# Patient Record
Sex: MALE | Race: WHITE
[De-identification: names, ages, dates, MRNs, and addresses within clinical notes are randomized per-mention and may not be internally consistent; named-entity substitution may affect disease eponyms.]

---

## 2017-12-23 ENCOUNTER — HOSPITAL ENCOUNTER (EMERGENCY)
Dept: HOSPITAL 17 - NEPD | Age: 22
LOS: 1 days | Discharge: HOME | End: 2017-12-24
Payer: COMMERCIAL

## 2017-12-23 VITALS
OXYGEN SATURATION: 97 % | SYSTOLIC BLOOD PRESSURE: 134 MMHG | HEART RATE: 109 BPM | RESPIRATION RATE: 14 BRPM | TEMPERATURE: 98.2 F | DIASTOLIC BLOOD PRESSURE: 69 MMHG

## 2017-12-23 DIAGNOSIS — F41.9: Primary | ICD-10-CM

## 2017-12-23 DIAGNOSIS — F43.10: ICD-10-CM

## 2017-12-23 DIAGNOSIS — F84.5: ICD-10-CM

## 2017-12-23 DIAGNOSIS — Z79.899: ICD-10-CM

## 2017-12-23 DIAGNOSIS — F98.8: ICD-10-CM

## 2017-12-23 LAB
ALBUMIN SERPL-MCNC: 3.7 GM/DL (ref 3.4–5)
ALP SERPL-CCNC: 143 U/L (ref 45–117)
ALT SERPL-CCNC: 21 U/L (ref 12–78)
AST SERPL-CCNC: 13 U/L (ref 15–37)
BASOPHILS # BLD AUTO: 0 TH/MM3 (ref 0–0.2)
BASOPHILS NFR BLD: 0.2 % (ref 0–2)
BILIRUB SERPL-MCNC: 0.5 MG/DL (ref 0.2–1)
BUN SERPL-MCNC: 13 MG/DL (ref 7–18)
CALCIUM SERPL-MCNC: 9.1 MG/DL (ref 8.5–10.1)
CHLORIDE SERPL-SCNC: 100 MEQ/L (ref 98–107)
CREAT SERPL-MCNC: 0.95 MG/DL (ref 0.6–1.3)
EOSINOPHIL # BLD: 0.1 TH/MM3 (ref 0–0.4)
EOSINOPHIL NFR BLD: 0.9 % (ref 0–4)
ERYTHROCYTE [DISTWIDTH] IN BLOOD BY AUTOMATED COUNT: 13 % (ref 11.6–17.2)
GFR SERPLBLD BASED ON 1.73 SQ M-ARVRAT: 99 ML/MIN (ref 89–?)
GLUCOSE SERPL-MCNC: 90 MG/DL (ref 74–106)
HCO3 BLD-SCNC: 30.6 MEQ/L (ref 21–32)
HCT VFR BLD CALC: 41 % (ref 39–51)
HGB BLD-MCNC: 14.6 GM/DL (ref 13–17)
LYMPHOCYTES # BLD AUTO: 1.6 TH/MM3 (ref 1–4.8)
LYMPHOCYTES NFR BLD AUTO: 13.1 % (ref 9–44)
MCH RBC QN AUTO: 29.3 PG (ref 27–34)
MCHC RBC AUTO-ENTMCNC: 35.6 % (ref 32–36)
MCV RBC AUTO: 82.2 FL (ref 80–100)
MONOCYTE #: 1.4 TH/MM3 (ref 0–0.9)
MONOCYTES NFR BLD: 11.7 % (ref 0–8)
NEUTROPHILS # BLD AUTO: 9.2 TH/MM3 (ref 1.8–7.7)
NEUTROPHILS NFR BLD AUTO: 74.1 % (ref 16–70)
PLATELET # BLD: 277 TH/MM3 (ref 150–450)
PMV BLD AUTO: 7.5 FL (ref 7–11)
PROT SERPL-MCNC: 7.7 GM/DL (ref 6.4–8.2)
RBC # BLD AUTO: 4.99 MIL/MM3 (ref 4.5–5.9)
SODIUM SERPL-SCNC: 136 MEQ/L (ref 136–145)
WBC # BLD AUTO: 12.4 TH/MM3 (ref 4–11)

## 2017-12-23 PROCEDURE — 85025 COMPLETE CBC W/AUTO DIFF WBC: CPT

## 2017-12-23 PROCEDURE — 99284 EMERGENCY DEPT VISIT MOD MDM: CPT

## 2017-12-23 PROCEDURE — 80053 COMPREHEN METABOLIC PANEL: CPT

## 2017-12-23 PROCEDURE — 80307 DRUG TEST PRSMV CHEM ANLYZR: CPT

## 2017-12-23 NOTE — PD
HPI


Chief Complaint:  Psychiatric Symptoms


Time Seen by Provider:  16:45


Travel History


International Travel<30 days:  No


Contact w/Intl Traveler<30days:  No


Traveled to known affect area:  No





History of Present Illness


HPI


22-year-old male patient with history of Asperger syndrome, ADHD, PTSD, 

presents to the ER brought in by mom because he apparently has had several 

medication changes done by his psychiatrist over the last few weeks and mom 

states that he is not doing well with the new medications.  He had been started 

on what to do, and she states that he has been fairly anxious, having racing 

thoughts, tells her that he is having these thoughts and they are of blood but 

he denies any actual suicidal or homicidal ideation.  Mom is concerned because 

he is pacing and appears based out.  He denies any homicidal or suicidal 

ideation.  Mom states that they wanted to try to get into see their 

psychiatrist but they are not open until after the new year.





Modifying Factors: None


Associated Signs & Symptoms: Increased anxiety, racing thoughts, hallucinations


Risk Factors: History of ADHD, Asperger syndrome





PFSH


Past Medical History


ADD:  Yes


Depression:  Yes


Diminished Hearing:  No


Psychiatric:  Yes (PTSD, ASPBERGER  )


Integumentary:  Yes (PSOARIASIS)


Immunizations Current:  No


Tetanus Vaccination:  Unknown


Influenza Vaccination:  No





Social History


Alcohol Use:  No


Tobacco Use:  No


Substance Use:  No





Allergies-Medications


(Allergen,Severity, Reaction):  


Coded Allergies:  


     No Known Allergies (Unverified , 12/23/17)


Reported Meds & Prescriptions





Reported Meds & Active Scripts


Active


Reported


Zoloft (Sertraline HCl) 25 Mg Tab 25 Mg PO DAILY








Review of Systems


ROS Limitations:  Poor Historian


Except as stated in HPI:  all other systems reviewed are Neg (history is as per 

mom)





Physical Exam


Narrative


GENERAL: Anxious appearing well-developed young white male patient currently in 

mild distress.  Awake and oriented 3.


SKIN: Focused skin assessment warm/dry.


HEAD: Atraumatic. Normocephalic. 


EYES: Pupils equal and round. No scleral icterus. No injection or drainage. 


ENT: No nasal bleeding or discharge.  Mucous membranes pink and moist.


NECK: Trachea midline. No JVD. 


CARDIOVASCULAR: Regular rate and rhythm.  No murmur appreciated.


RESPIRATORY: No accessory muscle use. Clear to auscultation. Breath sounds 

equal bilaterally. 


GASTROINTESTINAL: Abdomen soft, non-tender, nondistended. Hepatic and splenic 

margins not palpable. 


MUSCULOSKELETAL: No obvious deformities. No clubbing.  No cyanosis.  No edema. 


NEUROLOGICAL: Awake and alert. No obvious cranial nerve deficits.  Motor 

grossly within normal limits. Normal speech.


PSYCHIATRIC: Anxious mood and affect; insight and judgment poor.





Data


Data


Last Documented VS





Vital Signs








  Date Time  Temp Pulse Resp B/P (MAP) Pulse Ox O2 Delivery O2 Flow Rate FiO2


 


12/23/17 15:40 98.2 109 14 134/69 (90) 97   








Orders





 Orders


Complete Blood Count With Diff (12/23/17 16:55)


Comprehensive Metabolic Panel (12/23/17 16:55)


Psych Screen (12/23/17 16:55)


Drug Screen, Random Urine (12/23/17 16:55)


Alcohol (Ethanol) (12/23/17 16:55)








MDM


Medical Decision Making


Medical Screen Exam Complete:  Yes


Emergency Medical Condition:  Yes


Medical Record Reviewed:  Yes


Differential Diagnosis


Medication side effects versus psychosis versus metabolic issues versus anxiety 

attack


Narrative Course


Initial lab work and workup was done in order to medically cleared patient for 

psychiatric evaluation.  He and mom state that they want to stay voluntarily to 

be evaluated by psychiatrist.  He has no homicidal or suicidal ideation 

currently.





Physician Communication


Physician Communication


Case is discussed with Dr. Ortiz At 5 PM and signed out to him pending workup.





Diagnosis





 Primary Impression:  


 Anxiety


Condition:  Stable











Adrianna Gutierrez MD Dec 23, 2017 17:22

## 2017-12-23 NOTE — PD
Data


Data


Last Documented VS





Vital Signs








  Date Time  Temp Pulse Resp B/P (MAP) Pulse Ox O2 Delivery O2 Flow Rate FiO2


 


12/23/17 15:40 98.2 109 14 134/69 (90) 97   








Orders





 Orders


Complete Blood Count With Diff (12/23/17 16:55)


Comprehensive Metabolic Panel (12/23/17 16:55)


Psych Screen (12/23/17 16:55)


Drug Screen, Random Urine (12/23/17 16:55)


Alcohol (Ethanol) (12/23/17 16:55)





Labs





Laboratory Tests








Test


  12/23/17


17:25 12/23/17


17:30


 


White Blood Count 12.4 TH/MM3  


 


Red Blood Count 4.99 MIL/MM3  


 


Hemoglobin 14.6 GM/DL  


 


Hematocrit 41.0 %  


 


Mean Corpuscular Volume 82.2 FL  


 


Mean Corpuscular Hemoglobin 29.3 PG  


 


Mean Corpuscular Hemoglobin


Concent 35.6 % 


  


 


 


Red Cell Distribution Width 13.0 %  


 


Platelet Count 277 TH/MM3  


 


Mean Platelet Volume 7.5 FL  


 


Neutrophils (%) (Auto) 74.1 %  


 


Lymphocytes (%) (Auto) 13.1 %  


 


Monocytes (%) (Auto) 11.7 %  


 


Eosinophils (%) (Auto) 0.9 %  


 


Basophils (%) (Auto) 0.2 %  


 


Neutrophils # (Auto) 9.2 TH/MM3  


 


Lymphocytes # (Auto) 1.6 TH/MM3  


 


Monocytes # (Auto) 1.4 TH/MM3  


 


Eosinophils # (Auto) 0.1 TH/MM3  


 


Basophils # (Auto) 0.0 TH/MM3  


 


CBC Comment DIFF FINAL  


 


Differential Comment   


 


Blood Urea Nitrogen 13 MG/DL  


 


Creatinine 0.95 MG/DL  


 


Random Glucose 90 MG/DL  


 


Total Protein 7.7 GM/DL  


 


Albumin 3.7 GM/DL  


 


Calcium Level 9.1 MG/DL  


 


Alkaline Phosphatase 143 U/L  


 


Aspartate Amino Transf


(AST/SGOT) 13 U/L 


  


 


 


Alanine Aminotransferase


(ALT/SGPT) 21 U/L 


  


 


 


Total Bilirubin 0.5 MG/DL  


 


Sodium Level 136 MEQ/L  


 


Potassium Level 4.0 MEQ/L  


 


Chloride Level 100 MEQ/L  


 


Carbon Dioxide Level 30.6 MEQ/L  


 


Anion Gap 5 MEQ/L  


 


Estimat Glomerular Filtration


Rate 99 ML/MIN 


  


 


 


Ethyl Alcohol Level


  LESS THAN 3


MG/DL 


 


 


Urine Opiates Screen  NEG 


 


Urine Barbiturates Screen  NEG 


 


Urine Amphetamines Screen  NEG 


 


Urine Benzodiazepines Screen  NEG 


 


Urine Cocaine Screen  NEG 


 


Urine Cannabinoids Screen  NEG 











MDM


Supervised Visit with NEREYDA:  No


Narrative Course


Patient was initially evaluated by the previous provider and sent out to me at 

the beginning of my shift pending labs for medical clearance for psychiatric 

evaluation.  See her note for further details.





Briefly this is a 22-year-old male with history of Asperger's and ADD with 

recent medication changes by his psychiatrist, here with his mom for evaluation 

of unusual behavior and racing thoughts.  The patient denies suicidal or 

homicidal ideation.  He is awake, alert, calm, pleasant on exam.  Labs were 

reviewed and the patient is medically cleared for psychiatric evaluation and 

disposition by them.


Diagnosis





 Primary Impression:  


 Anxiety


Condition:  Stable











Roosevelt Ortiz MD Dec 23, 2017 19:33

## 2017-12-24 VITALS — SYSTOLIC BLOOD PRESSURE: 122 MMHG | DIASTOLIC BLOOD PRESSURE: 57 MMHG

## 2017-12-24 NOTE — PD.PSY.CON
Provisional Diagnosis


Admission Date





[place]


[place] disorder





History of Present Illness


Service


Psychiatry


Consult Requested By


EDMD


Reason for Consult


Assessment


Primary Care Physician


Unknown


HPI


Patient 22-year-old white male with a diagnosis of Asperger's disorder comes to 

nurse department with his mother.  Mother appears to be invested in her son.  

She and her son live with her parents.  Her son is being seen through [facility].  Diffusely been on Zoloft 50 mg daily.  It appears she showed 

some type of anxiety disorder or mood behavior she was seen by the [facility]NP.  The 

dose was reduced to 25 mg.  Of them discontinued.  The boy has shown some mild 

arousal symptoms some distractibility some vague paranoia.  It appears she 

prior was on Prozac and developed tremors which led to switch to Zoloft.  

Mother denies any prior psychiatric hospitalization for this young man.  She 

denies any alcohol or drug use denies any voices or visions.  Patient has the 

typical Ascaris type voices behaviors somewhat childlike in nature.  Deferring 

completely to his mother.  In any event the patient does denies suicidality 

homicidality voices or visions.  He has been off all medication for a brief 

period of time.  We did discuss medications with patient's mother.  I feel at 

this time perhaps a low-dose of a second generation atypical antipsychotic 

might help with his mood and so the anxiety type features.  Will offer Abilify 

5 mg a.m. and at bedtime with a 2 week supply.  Patient does have an 

appointment with [facility] on 1/10/18.  Mother is agreeable to this.





Review of Systems


Constitutional:  DENIES: Diaphoretic episodes, Fatigue, Fever, Weight gain, 

Weight loss, Chills, Dizziness, Change in appetite, Night Sweats


Endocrine:  DENIES: Heat/cold intolerance, Polydipsia, Polyuria, Polyphagia


Eyes:  DENIES: Blurred vision, Diplopia, Eye inflammation, Eye pain, Vision loss

, Photosensitivity, Double Vision


Ears, nose, mouth, throat:  DENIES: Tinnitus, Hearing loss, Vertigo, Nasal 

discharge, Oral lesions, Throat pain, Hoarseness, Ear Pain, Running Nose, 

Epistaxis, Sinus Pain, Toothache, Odynophagia


Respiratory:  DENIES: Apneas, Cough, Snoring, Wheezing, Hemoptysis, Sputum 

production, Shortness of breath


Cardiovascular:  DENIES: Chest pain, Palpitations, Syncope, Dyspnea on Exertion

, PND, Lower Extremity Edema, Orthopnea, Claudication


Gastrointestinal:  DENIES: Abdominal pain, Black stools, Bloody stools, 

Constipation, Diarrhea, Nausea, Vomiting, Difficulty Swallowing, Anorexia


Musculoskeletal:  DENIES: Joint pain, Muscle aches, Stiffness, Joint Swelling, 

Back pain, Neck pain


Hematologic/lymphatic:  DENIES: Bruising, Lymphadenopathy


Psychiatric:  COMPLAINS OF: Anxiety (mild), Hallucinations (denies), Suicidal 

Ideation (denies)





Past Family Social History


Coded Allergies:  


     No Known Allergies (Unverified , 12/23/17)


Past Medical History


History Asperger's


Reported Medications


Sertraline (Zoloft) 25 Mg Tab, 25 MG PO DAILY, #30 TAB 0 Refills


   12/23/17


Family Psych History


Denies


Social History


Patient lives with mother and his maternal grandparents


Patient's Strengths (min. 2)


Patient verbal cooperative has supportive family





Physical Exam


Patient medically cleared ED


Vital Signs





Vital Signs








  Date Time  Temp Pulse Resp B/P (MAP) Pulse Ox O2 Delivery O2 Flow Rate FiO2


 


12/23/17 15:40 98.2 109 14 134/69 (90) 97   








Lab Results











Test


  12/23/17


17:25 12/23/17


17:30


 


White Blood Count 12.4 TH/MM3  


 


Red Blood Count 4.99 MIL/MM3  


 


Hemoglobin 14.6 GM/DL  


 


Hematocrit 41.0 %  


 


Mean Corpuscular Volume 82.2 FL  


 


Mean Corpuscular Hemoglobin 29.3 PG  


 


Mean Corpuscular Hemoglobin


Concent 35.6 % 


  


 


 


Red Cell Distribution Width 13.0 %  


 


Platelet Count 277 TH/MM3  


 


Mean Platelet Volume 7.5 FL  


 


Neutrophils (%) (Auto) 74.1 %  


 


Lymphocytes (%) (Auto) 13.1 %  


 


Monocytes (%) (Auto) 11.7 %  


 


Eosinophils (%) (Auto) 0.9 %  


 


Basophils (%) (Auto) 0.2 %  


 


Neutrophils # (Auto) 9.2 TH/MM3  


 


Lymphocytes # (Auto) 1.6 TH/MM3  


 


Monocytes # (Auto) 1.4 TH/MM3  


 


Eosinophils # (Auto) 0.1 TH/MM3  


 


Basophils # (Auto) 0.0 TH/MM3  


 


CBC Comment DIFF FINAL  


 


Differential Comment   


 


Blood Urea Nitrogen 13 MG/DL  


 


Creatinine 0.95 MG/DL  


 


Random Glucose 90 MG/DL  


 


Total Protein 7.7 GM/DL  


 


Albumin 3.7 GM/DL  


 


Calcium Level 9.1 MG/DL  


 


Alkaline Phosphatase 143 U/L  


 


Aspartate Amino Transf


(AST/SGOT) 13 U/L 


  


 


 


Alanine Aminotransferase


(ALT/SGPT) 21 U/L 


  


 


 


Total Bilirubin 0.5 MG/DL  


 


Sodium Level 136 MEQ/L  


 


Potassium Level 4.0 MEQ/L  


 


Chloride Level 100 MEQ/L  


 


Carbon Dioxide Level 30.6 MEQ/L  


 


Anion Gap 5 MEQ/L  


 


Estimat Glomerular Filtration


Rate 99 ML/MIN 


  


 


 


Ethyl Alcohol Level


  LESS THAN 3


MG/DL 


 


 


Urine Opiates Screen  NEG 


 


Urine Barbiturates Screen  NEG 


 


Urine Amphetamines Screen  NEG 


 


Urine Benzodiazepines Screen  NEG 


 


Urine Cocaine Screen  NEG 


 


Urine Cannabinoids Screen  NEG 











Mental Status Examination


Appearance:  Appropriate


Consciousness:  Alert


Orientation:  Person, Place, Date/Time


Motor Activity:  Normal gait


Speech:  Hesitant


Language:  Adequate


Fund of Knowledge:  Adequate


Attention and Concentration:  Other (fair)


Memory:  Unremarkable


Mood:  Other (somewhat restricted)


Affect:  Other (decreased range and intensity)


Thought Process & Associations:  Linear


Thought Content:  Ideas of reference


Hallucination Type:  None


Delusion Type:  None


Suicidal Ideation:  No


Suicidal Plan:  No


Suicidal Intention:  No


Homicidal Ideation:  No


Homicidal Plan:  No


Homicidal Intention:  No


Insight:  Fair


Judgment:  Adequate





Assessment & Plan


Problem List:  


(1) Asperger's disorder


ICD Codes:  F84.5 - Asperger's syndrome


Assessment & Plan


Estimated LOS:  days patient calm cooperative though deferring to.  His mother 

extensively.  Will offer Abilify 5 mg twice a day, patient will follow-up 

through [facility] on 1/10/17


Discharge Planning


See above


Request HC Surrog/Guard Advoc?:  No











Jian Mackay MD Dec 24, 2017 10:57

## 2017-12-24 NOTE — PD
Physical Exam


Date Seen by Provider:  Dec 24, 2017


Time Seen by Provider:  10:57





Data


Data


Last Documented VS





Vital Signs








  Date Time  Temp Pulse Resp B/P (MAP) Pulse Ox O2 Delivery O2 Flow Rate FiO2


 


12/24/17 11:13  95 17 122/57 (78) 96   


 


12/23/17 15:40 98.2       








Orders





 Orders


Complete Blood Count With Diff (12/23/17 16:55)


Comprehensive Metabolic Panel (12/23/17 16:55)


Psych Screen (12/23/17 16:55)


Drug Screen, Random Urine (12/23/17 16:55)


Alcohol (Ethanol) (12/23/17 16:55)


Diet Regular Basic (12/24/17 Breakfast)


Ed Discharge Order (12/24/17 11:01)





Labs





Laboratory Tests








Test


  12/23/17


17:25 12/23/17


17:30


 


White Blood Count 12.4 TH/MM3  


 


Red Blood Count 4.99 MIL/MM3  


 


Hemoglobin 14.6 GM/DL  


 


Hematocrit 41.0 %  


 


Mean Corpuscular Volume 82.2 FL  


 


Mean Corpuscular Hemoglobin 29.3 PG  


 


Mean Corpuscular Hemoglobin


Concent 35.6 % 


  


 


 


Red Cell Distribution Width 13.0 %  


 


Platelet Count 277 TH/MM3  


 


Mean Platelet Volume 7.5 FL  


 


Neutrophils (%) (Auto) 74.1 %  


 


Lymphocytes (%) (Auto) 13.1 %  


 


Monocytes (%) (Auto) 11.7 %  


 


Eosinophils (%) (Auto) 0.9 %  


 


Basophils (%) (Auto) 0.2 %  


 


Neutrophils # (Auto) 9.2 TH/MM3  


 


Lymphocytes # (Auto) 1.6 TH/MM3  


 


Monocytes # (Auto) 1.4 TH/MM3  


 


Eosinophils # (Auto) 0.1 TH/MM3  


 


Basophils # (Auto) 0.0 TH/MM3  


 


CBC Comment DIFF FINAL  


 


Differential Comment   


 


Blood Urea Nitrogen 13 MG/DL  


 


Creatinine 0.95 MG/DL  


 


Random Glucose 90 MG/DL  


 


Total Protein 7.7 GM/DL  


 


Albumin 3.7 GM/DL  


 


Calcium Level 9.1 MG/DL  


 


Alkaline Phosphatase 143 U/L  


 


Aspartate Amino Transf


(AST/SGOT) 13 U/L 


  


 


 


Alanine Aminotransferase


(ALT/SGPT) 21 U/L 


  


 


 


Total Bilirubin 0.5 MG/DL  


 


Sodium Level 136 MEQ/L  


 


Potassium Level 4.0 MEQ/L  


 


Chloride Level 100 MEQ/L  


 


Carbon Dioxide Level 30.6 MEQ/L  


 


Anion Gap 5 MEQ/L  


 


Estimat Glomerular Filtration


Rate 99 ML/MIN 


  


 


 


Ethyl Alcohol Level


  LESS THAN 3


MG/DL 


 


 


Urine Opiates Screen  NEG 


 


Urine Barbiturates Screen  NEG 


 


Urine Amphetamines Screen  NEG 


 


Urine Benzodiazepines Screen  NEG 


 


Urine Cocaine Screen  NEG 


 


Urine Cannabinoids Screen  NEG 











MDM


Supervised Visit with NEREYDA:  No


Narrative Course


22-year-old male with PMH of Asperger's initially evaluated by Dr. Major.


He was out at change of shift and medically cleared by Dr. Ortiz


He was evaluated by Dr. Mackay, deemed safe for discharge.


He is provided with a prescription for Abilify.


Mother is at bedside.  The patient is stable and discharged home.


Diagnosis





 Primary Impression:  


 Anxiety


 Additional Impression:  


 Asperger's disorder


Referrals:  


Primary Care Physician





***Additional Instruction:  


Follow up as discussed with Dr. Mackay.


Return to the ED for any urgent or emergent medical condition.


***Med/Other Pt SpecificInfo:  Prescription(s) given


Scripts


Aripiprazole (Abilify) 10 Mg Tab


10 MG PO 1/2 po  am  and  for health, #14 TAB 0 Refills


   Prov: Jian Mackay MD         12/24/17


Disposition:  01 DISCHARGE HOME


Condition:  Stable











Emily Winkler Dec 24, 2017 10:58

## 2018-01-12 ENCOUNTER — HOSPITAL ENCOUNTER (EMERGENCY)
Dept: HOSPITAL 17 - NEPC | Age: 23
Discharge: HOME | End: 2018-01-12
Payer: COMMERCIAL

## 2018-01-12 VITALS
TEMPERATURE: 98.5 F | OXYGEN SATURATION: 99 % | DIASTOLIC BLOOD PRESSURE: 75 MMHG | SYSTOLIC BLOOD PRESSURE: 122 MMHG | RESPIRATION RATE: 16 BRPM | HEART RATE: 89 BPM

## 2018-01-12 VITALS — HEIGHT: 65 IN | WEIGHT: 143.3 LBS | BODY MASS INDEX: 23.88 KG/M2

## 2018-01-12 VITALS
DIASTOLIC BLOOD PRESSURE: 70 MMHG | HEART RATE: 96 BPM | SYSTOLIC BLOOD PRESSURE: 108 MMHG | RESPIRATION RATE: 20 BRPM | OXYGEN SATURATION: 99 %

## 2018-01-12 DIAGNOSIS — T43.505A: ICD-10-CM

## 2018-01-12 DIAGNOSIS — F20.9: ICD-10-CM

## 2018-01-12 DIAGNOSIS — F31.9: ICD-10-CM

## 2018-01-12 DIAGNOSIS — G25.71: Primary | ICD-10-CM

## 2018-01-12 DIAGNOSIS — F43.10: ICD-10-CM

## 2018-01-12 PROCEDURE — 99284 EMERGENCY DEPT VISIT MOD MDM: CPT

## 2018-01-12 PROCEDURE — 96372 THER/PROPH/DIAG INJ SC/IM: CPT

## 2018-01-12 NOTE — PD
HPI


Chief Complaint:  Allergic/Adverse Reaction


Time Seen by Provider:  00:22


Travel History


International Travel<30 days:  No


Contact w/Intl Traveler<30days:  No


Traveled to known affect area:  No





History of Present Illness


HPI


Patient is a 22-year-old male coming in after having taken a new antipsychotic 

med that he started today.  The sublingual med that has quieted his mind and he 

is not hearing the voices his mother reports.  However now he has developed 

this twitching shaking in his neck and his legs.  It does not appear to be 

akathisia and it does not appear to be tardive dyskinesia.  However the patient 

is not on Cogentin benztropine nor Benadryl.  Patient did not take anything to 

help relieve the symptoms but when they called the on-call psychiatrist they 

were told to come to the ER immediately.  In the ER the patient is seems to 

having of voluntary shaking of his head intermittently as well as his lower 

legs possibly it is akathisia I will treated as as such





PFSH


Past Medical History


ADD:  Yes


Bipolar Disorder:  Yes


Depression:  Yes


Diminished Hearing:  No


Psychiatric:  Yes (PTSD, Asperger)


Integumentary:  Yes (PSOARIASIS)


Immunizations Current:  No


Schizophrenia:  Yes


Influenza Vaccination:  No





Past Surgical History


Tonsillectomy:  Yes (adenoidectomy as a baby)





Social History


Alcohol Use:  No


Tobacco Use:  No


Substance Use:  No





Allergies-Medications


(Allergen,Severity, Reaction):  


Coded Allergies:  


     No Known Allergies (Unverified , 1/12/18)


Reported Meds & Prescriptions





Reported Meds & Active Scripts


Active


Risperdal (Risperidone) 1 Mg Tab 1 Mg PO Q12HR 30 Days


Benztropine (Benztropine Mesylate) 0.5 Mg Tab 0.5 Mg PO BID 30 Days








Review of Systems


Except as stated in HPI:  all other systems reviewed are Neg





Physical Exam


Narrative


GENERAL: shaking his neck and legs trembling but seems voluntary as opposed to 

tardive dyskinesia


SKIN: Warm and dry.


HEAD: Atraumatic. Normocephalic. 


EYES: Pupils equal and round. No scleral icterus. No injection or drainage. 


ENT: No nasal bleeding or discharge.  Mucous membranes pink and moist.


NECK: Trachea midline. No JVD. tensing muscles of his neck 


CARDIOVASCULAR: Regular rate and rhythm.  


RESPIRATORY: No accessory muscle use. Clear to auscultation. Breath sounds 

equal bilaterally. 


GASTROINTESTINAL: Abdomen soft, non-tender, nondistended. Hepatic and splenic 

margins not palpable. 


MUSCULOSKELETAL: Extremities legs  shaking  bilaterarl without clubbing, 

cyanosis, or edema. No obvious deformities. 


NEUROLOGICAL: Awake and alert. No obvious cranial nerve deficits.  Motor 

grossly within normal limits. Five out of 5 muscle strength in the arms and 

legs.  Normal speech.


PSYCHIATRIC: shaking head and  neck and  legs  but when asleep it stops .





Data


Data


Last Documented VS





Orders





 Orders


Diphenhydramine Inj (Benadryl Inj) (1/12/18 01:15)


Benztropine (Cogentin) (1/12/18 01:15)


Lorazepam (Ativan) (1/12/18 02:45)


Ed Discharge Order (1/12/18 03:42)








[facility]


Medical Decision Making


Medical Screen Exam Complete:  Yes


Emergency Medical Condition:  Yes


Differential Diagnosis


Akathisia versus tardive dyskinesia versus adverse drug reaction versus 

voluntary shaking


Narrative Course


Cogentin seems to have alleviated his symptoms while the patient sleeps he has 

no shaking however when he wakes up he shakes for a moment or 2 but then he 

stops I will write for prescription for Cogentin twice a day to take with his 

new antipsychotic and follow-up with the psychiatrist in the morning





Diagnosis





 Primary Impression:  


 Akathisia


Patient Instructions:  Adverse Drug Reaction (ED), General Instructions


Disposition:  01 DISCHARGE HOME


Condition:  Papi Barnett MD Jan 12, 2018 02:24

## 2018-01-14 ENCOUNTER — HOSPITAL ENCOUNTER (INPATIENT)
Dept: HOSPITAL 17 - NEPD | Age: 23
LOS: 3 days | Discharge: HOME | DRG: 885 | End: 2018-01-17
Attending: PSYCHIATRY & NEUROLOGY | Admitting: PSYCHIATRY & NEUROLOGY
Payer: COMMERCIAL

## 2018-01-14 VITALS
HEART RATE: 103 BPM | RESPIRATION RATE: 18 BRPM | TEMPERATURE: 98.5 F | OXYGEN SATURATION: 100 % | SYSTOLIC BLOOD PRESSURE: 147 MMHG | DIASTOLIC BLOOD PRESSURE: 69 MMHG

## 2018-01-14 VITALS
DIASTOLIC BLOOD PRESSURE: 80 MMHG | HEART RATE: 98 BPM | RESPIRATION RATE: 18 BRPM | OXYGEN SATURATION: 100 % | SYSTOLIC BLOOD PRESSURE: 127 MMHG

## 2018-01-14 VITALS
OXYGEN SATURATION: 100 % | TEMPERATURE: 98.1 F | RESPIRATION RATE: 18 BRPM | HEART RATE: 105 BPM | SYSTOLIC BLOOD PRESSURE: 119 MMHG | DIASTOLIC BLOOD PRESSURE: 64 MMHG

## 2018-01-14 VITALS — BODY MASS INDEX: 28.38 KG/M2 | HEIGHT: 64 IN | WEIGHT: 166.23 LBS

## 2018-01-14 VITALS — TEMPERATURE: 98.5 F | SYSTOLIC BLOOD PRESSURE: 147 MMHG | DIASTOLIC BLOOD PRESSURE: 69 MMHG | HEART RATE: 103 BPM

## 2018-01-14 DIAGNOSIS — F31.9: ICD-10-CM

## 2018-01-14 DIAGNOSIS — T43.505A: ICD-10-CM

## 2018-01-14 DIAGNOSIS — E87.5: ICD-10-CM

## 2018-01-14 DIAGNOSIS — F20.0: Primary | ICD-10-CM

## 2018-01-14 DIAGNOSIS — G25.71: ICD-10-CM

## 2018-01-14 DIAGNOSIS — F43.10: ICD-10-CM

## 2018-01-14 DIAGNOSIS — F84.0: ICD-10-CM

## 2018-01-14 LAB
ALBUMIN SERPL-MCNC: 4 GM/DL (ref 3.4–5)
ALP SERPL-CCNC: 147 U/L (ref 45–117)
ALT SERPL-CCNC: 37 U/L (ref 12–78)
AST SERPL-CCNC: 57 U/L (ref 15–37)
BASOPHILS # BLD AUTO: 0 TH/MM3 (ref 0–0.2)
BASOPHILS NFR BLD: 0.5 % (ref 0–2)
BILIRUB SERPL-MCNC: 0.5 MG/DL (ref 0.2–1)
BUN SERPL-MCNC: 9 MG/DL (ref 7–18)
CALCIUM SERPL-MCNC: 9.2 MG/DL (ref 8.5–10.1)
CHLORIDE SERPL-SCNC: 104 MEQ/L (ref 98–107)
COLOR UR: (no result)
CREAT SERPL-MCNC: 0.92 MG/DL (ref 0.6–1.3)
EOSINOPHIL # BLD: 0.1 TH/MM3 (ref 0–0.4)
EOSINOPHIL NFR BLD: 1.2 % (ref 0–4)
ERYTHROCYTE [DISTWIDTH] IN BLOOD BY AUTOMATED COUNT: 13.2 % (ref 11.6–17.2)
GFR SERPLBLD BASED ON 1.73 SQ M-ARVRAT: 103 ML/MIN (ref 89–?)
GLUCOSE SERPL-MCNC: 81 MG/DL (ref 74–106)
GLUCOSE UR STRIP-MCNC: (no result) MG/DL
HCO3 BLD-SCNC: 27.5 MEQ/L (ref 21–32)
HCT VFR BLD CALC: 44.6 % (ref 39–51)
HGB BLD-MCNC: 15.5 GM/DL (ref 13–17)
HGB UR QL STRIP: (no result)
KETONES UR STRIP-MCNC: (no result) MG/DL
LYMPHOCYTES # BLD AUTO: 1.5 TH/MM3 (ref 1–4.8)
LYMPHOCYTES NFR BLD AUTO: 17.7 % (ref 9–44)
MCH RBC QN AUTO: 28.9 PG (ref 27–34)
MCHC RBC AUTO-ENTMCNC: 34.7 % (ref 32–36)
MCV RBC AUTO: 83.4 FL (ref 80–100)
MONOCYTE #: 0.6 TH/MM3 (ref 0–0.9)
MONOCYTES NFR BLD: 7.3 % (ref 0–8)
MUCOUS THREADS #/AREA URNS LPF: (no result) /LPF
NEUTROPHILS # BLD AUTO: 6.2 TH/MM3 (ref 1.8–7.7)
NEUTROPHILS NFR BLD AUTO: 73.3 % (ref 16–70)
NITRITE UR QL STRIP: (no result)
PLATELET # BLD: 300 TH/MM3 (ref 150–450)
PMV BLD AUTO: 7.8 FL (ref 7–11)
PROT SERPL-MCNC: 7.8 GM/DL (ref 6.4–8.2)
RBC # BLD AUTO: 5.35 MIL/MM3 (ref 4.5–5.9)
SODIUM SERPL-SCNC: 138 MEQ/L (ref 136–145)
SP GR UR STRIP: 1.01 (ref 1–1.03)
URINE LEUKOCYTE ESTERASE: (no result)
WBC # BLD AUTO: 8.4 TH/MM3 (ref 4–11)

## 2018-01-14 PROCEDURE — 84443 ASSAY THYROID STIM HORMONE: CPT

## 2018-01-14 PROCEDURE — 85025 COMPLETE CBC W/AUTO DIFF WBC: CPT

## 2018-01-14 PROCEDURE — 96374 THER/PROPH/DIAG INJ IV PUSH: CPT

## 2018-01-14 PROCEDURE — 80307 DRUG TEST PRSMV CHEM ANLYZR: CPT

## 2018-01-14 PROCEDURE — 80061 LIPID PANEL: CPT

## 2018-01-14 PROCEDURE — 81001 URINALYSIS AUTO W/SCOPE: CPT

## 2018-01-14 PROCEDURE — 82607 VITAMIN B-12: CPT

## 2018-01-14 PROCEDURE — 93005 ELECTROCARDIOGRAM TRACING: CPT

## 2018-01-14 PROCEDURE — 80053 COMPREHEN METABOLIC PANEL: CPT

## 2018-01-14 PROCEDURE — 82306 VITAMIN D 25 HYDROXY: CPT

## 2018-01-14 PROCEDURE — 71046 X-RAY EXAM CHEST 2 VIEWS: CPT

## 2018-01-14 PROCEDURE — 83036 HEMOGLOBIN GLYCOSYLATED A1C: CPT

## 2018-01-14 RX ADMIN — BENZTROPINE MESYLATE SCH MG: 1 TABLET ORAL at 21:00

## 2018-01-14 NOTE — PD
HPI


Chief Complaint:  Psychiatric Symptoms


Time Seen by Provider:  13:49


Travel History


International Travel<30 days:  No


Contact w/Intl Traveler<30days:  No


Traveled to known affect area:  No





History of Present Illness


HPI


Patient is a 22-year-old male with a history of Asperger's disorder and recent 

diagnosis of schizophrenia presents emergency department for evaluation of 

auditory hallucinations.  Mom states that he was recently admitted to this 

hospital started on Abilify and since starting the Abilify he's had shakes in 

his head but has also been hearing voices telling him to hurt himself and she 

is afraid for his life thinking that he might actually obey the voices and hurt 

himself.  The patient is fairly cooperative but is certainly interacting with 

unseen stimuli in the room.  Exhibiting tremor shaking his head back and forth 

but only intermittently so.  Somewhat limited history given his Asperger's 

disorder.  He is currently in between psychiatrists as his psychiatrist closed 

her office.





Patient's only physical complaint to me is bilateral low chest pain, mom states 

that he recently got over the flu and pneumonia, he's been coughing a lot 

recently has been complaining of this pain intermittently on respirations for 

the past few days.





PFSH


Past Medical History


ADD:  Yes


Bipolar Disorder:  Yes


Depression:  Yes


Diminished Hearing:  No


Psychiatric:  Yes (PTSD, Asperger)


Integumentary:  Yes (PSOARIASIS)


Immunizations Current:  No


Schizophrenia:  Yes





Past Surgical History


Tonsillectomy:  Yes (adenoidectomy as a baby)





Social History


Alcohol Use:  No


Tobacco Use:  No


Substance Use:  No





Allergies-Medications


(Allergen,Severity, Reaction):  


Coded Allergies:  


     No Known Allergies (Unverified , 1/12/18)


Reported Meds & Prescriptions





Reported Meds & Active Scripts


Active


Benztropine (Benztropine Mesylate) 0.5 Mg Tab 0.5 Mg PO BID


Reported


Saphris (Asenapine) 5 Mg Subl 5 Mg SL BID








Review of Systems


ROS Limitations:  Psychotic


Except as stated in HPI:  all other systems reviewed are Neg





Physical Exam


Narrative


GENERAL: 


SKIN: Focused skin assessment warm/dry.


HEAD: Atraumatic. Normocephalic. 


EYES: Pupils equal and round. No scleral icterus. No injection or drainage. 


ENT: No nasal bleeding or discharge.  Mucous membranes pink and moist.


NECK: Trachea midline. No JVD. 


CARDIOVASCULAR: Regular rate and rhythm.  No murmur appreciated.


RESPIRATORY: No accessory muscle use. Clear to auscultation. Breath sounds 

equal bilaterally. 


GASTROINTESTINAL: Abdomen soft, non-tender, nondistended. Hepatic and splenic 

margins not palpable. 


MUSCULOSKELETAL: No obvious deformities. No clubbing.  No cyanosis.  No edema. 


NEUROLOGICAL: Awake and alert. No obvious cranial nerve deficits.  Motor 

grossly within normal limits. Normal speech.


PSYCHIATRIC: Appropriate mood and affect; insight and judgment normal.





Data


Data


Last Documented VS





Vital Signs








  Date Time  Temp Pulse Resp B/P (MAP) Pulse Ox O2 Delivery O2 Flow Rate FiO2


 


1/14/18 12:20 98.1 105 18 119/64 (82) 100 Room Air  








Orders





 Orders


Complete Blood Count With Diff (1/14/18 14:16)


Comprehensive Metabolic Panel (1/14/18 14:16)


Thyroid Stimulating Hormone (1/14/18 14:16)


Psych Screen (1/14/18 14:16)


Drug Screen, Random Urine (1/14/18 14:16)


Alcohol (Ethanol) (1/14/18 14:16)


Diphenhydramine Inj (Benadryl Inj) (1/14/18 14:30)


Chest, Pa & Lat (1/14/18 )


Urinalysis - C+S If Indicated (1/14/18 14:16)


Admit Order (Ed Use Only) (1/14/18 15:28)





Labs





Laboratory Tests








Test


  1/14/18


14:16


 


Urine Color LIGHT-YELLOW 


 


Urine Turbidity CLEAR 


 


Urine pH 8.0 


 


Urine Specific Gravity 1.010 


 


Urine Protein NEG mg/dL 


 


Urine Glucose (UA) NEG mg/dL 


 


Urine Ketones NEG mg/dL 


 


Urine Occult Blood NEG 


 


Urine Nitrite NEG 


 


Urine Bilirubin NEG 


 


Urine Urobilinogen


  LESS THAN 2.0


MG/DL


 


Urine Leukocyte Esterase NEG 


 


Urine WBC


  LESS THAN 1


/hpf


 


Urine Mucus FEW /lpf 


 


Microscopic Urinalysis Comment


  CULT NOT


INDICATED











MDM


Medical Decision Making


Medical Screen Exam Complete:  Yes


Emergency Medical Condition:  Yes


Differential Diagnosis


Suicidal ideation, psychosis, Asperger's.


Narrative Course


D/W Dr. Hines directly, patient to be admitted to psychiatry.  Medically 

cleared for admission.





Patient's labs reviewed, mild hyperkalemia but hemolysis noted.  No indication 

for further workup at this time.  Medically cleared for psychiatric admission





Diagnosis





 Primary Impression:  


 Asperger's disorder


 Additional Impression:  


 Schizophrenia, paranoid type





Admitting Information


Admitting Physician Requests:  Admit


Condition:  Stable











Esa Betancourt MD Jan 14, 2018 14:32

## 2018-01-14 NOTE — RADRPT
EXAM DATE/TIME:  01/14/2018 14:24 

 

[facility] COMPARISON:     

No previous studies available for comparison.

 

                     

INDICATIONS :     

Chest pain

                     

 

MEDICAL HISTORY :            

Pneumonia   

 

SURGICAL HISTORY :     

None.   

 

ENCOUNTER:     

Initial                                        

 

ACUITY:     

3 weeks      

 

PAIN SCORE:     

8/10

 

LOCATION:     

Bilateral chest 

 

FINDINGS:     

PA and lateral views of the chest demonstrate the lungs to be symmetrically aerated without evidence 
of mass, infiltrate or effusion.  The cardiomediastinal contours are unremarkable.  Osseous structure
s are intact.

 

CONCLUSION:     

No acute cardiopulmonary disease.

 

 

 

 Zander Reddy MD on January 14, 2018 at 15:10           

Board Certified Radiologist.

 This report was verified electronically.

## 2018-01-14 NOTE — PD
Physical Exam


Time Seen by Provider:  13:30


Narrative


23yo M presents, with his mother, voluntarily for psych evaluation.  Having 

severe hallucinations and current medications are not working.  Denies SI or 

HI.      





Patient seen in triage. VS reviewed.  Awaiting bed placement.  See next 

providers note for final patient disposition.





Data


Data


Last Documented VS





Vital Signs








  Date Time  Temp Pulse Resp B/P (MAP) Pulse Ox O2 Delivery O2 Flow Rate FiO2


 


1/14/18 12:20 98.1 105 18 119/64 (82) 100 Room Air  











MDM


Supervised Visit with NEREYDA:  Sonia Guerrier Jan 14, 2018 13:32

## 2018-01-15 VITALS
DIASTOLIC BLOOD PRESSURE: 56 MMHG | SYSTOLIC BLOOD PRESSURE: 113 MMHG | TEMPERATURE: 97.6 F | RESPIRATION RATE: 16 BRPM | OXYGEN SATURATION: 95 % | HEART RATE: 94 BPM

## 2018-01-15 VITALS
DIASTOLIC BLOOD PRESSURE: 58 MMHG | RESPIRATION RATE: 16 BRPM | HEART RATE: 97 BPM | TEMPERATURE: 98 F | SYSTOLIC BLOOD PRESSURE: 111 MMHG | OXYGEN SATURATION: 97 %

## 2018-01-15 LAB
ALBUMIN SERPL-MCNC: 3.9 GM/DL (ref 3.4–5)
ALP SERPL-CCNC: 144 U/L (ref 45–117)
ALT SERPL-CCNC: 28 U/L (ref 12–78)
AST SERPL-CCNC: 18 U/L (ref 15–37)
BASOPHILS # BLD AUTO: 0 TH/MM3 (ref 0–0.2)
BASOPHILS NFR BLD: 0.2 % (ref 0–2)
BILIRUB SERPL-MCNC: 0.3 MG/DL (ref 0.2–1)
BUN SERPL-MCNC: 10 MG/DL (ref 7–18)
CALCIUM SERPL-MCNC: 9.1 MG/DL (ref 8.5–10.1)
CHLORIDE SERPL-SCNC: 106 MEQ/L (ref 98–107)
CHOLEST SERPL-MCNC: 165 MG/DL (ref 120–200)
CHOLESTEROL/ HDL RATIO: 4.68 RATIO
CREAT SERPL-MCNC: 1.03 MG/DL (ref 0.6–1.3)
EOSINOPHIL # BLD: 0.1 TH/MM3 (ref 0–0.4)
EOSINOPHIL NFR BLD: 1 % (ref 0–4)
ERYTHROCYTE [DISTWIDTH] IN BLOOD BY AUTOMATED COUNT: 13.4 % (ref 11.6–17.2)
GFR SERPLBLD BASED ON 1.73 SQ M-ARVRAT: 90 ML/MIN (ref 89–?)
GLUCOSE SERPL-MCNC: 92 MG/DL (ref 74–106)
HBA1C MFR BLD: 5.1 % (ref 4.3–6)
HCO3 BLD-SCNC: 27 MEQ/L (ref 21–32)
HCT VFR BLD CALC: 44 % (ref 39–51)
HDLC SERPL-MCNC: 35.2 MG/DL (ref 40–60)
HGB BLD-MCNC: 15.6 GM/DL (ref 13–17)
LDLC SERPL-MCNC: 104 MG/DL (ref 0–99)
LYMPHOCYTES # BLD AUTO: 2.2 TH/MM3 (ref 1–4.8)
LYMPHOCYTES NFR BLD AUTO: 25.6 % (ref 9–44)
MCH RBC QN AUTO: 29.1 PG (ref 27–34)
MCHC RBC AUTO-ENTMCNC: 35.3 % (ref 32–36)
MCV RBC AUTO: 82.4 FL (ref 80–100)
MONOCYTE #: 0.6 TH/MM3 (ref 0–0.9)
MONOCYTES NFR BLD: 7.3 % (ref 0–8)
NEUTROPHILS # BLD AUTO: 5.6 TH/MM3 (ref 1.8–7.7)
NEUTROPHILS NFR BLD AUTO: 65.9 % (ref 16–70)
PLATELET # BLD: 280 TH/MM3 (ref 150–450)
PMV BLD AUTO: 7.5 FL (ref 7–11)
PROT SERPL-MCNC: 7.4 GM/DL (ref 6.4–8.2)
RBC # BLD AUTO: 5.35 MIL/MM3 (ref 4.5–5.9)
SODIUM SERPL-SCNC: 140 MEQ/L (ref 136–145)
TRIGL SERPL-MCNC: 131 MG/DL (ref 42–150)
VIT B12 SERPL-MCNC: 876 PG/ML (ref 193–986)
WBC # BLD AUTO: 8.5 TH/MM3 (ref 4–11)

## 2018-01-15 RX ADMIN — BENZTROPINE MESYLATE SCH MG: 1 TABLET ORAL at 20:54

## 2018-01-15 RX ADMIN — BENZTROPINE MESYLATE SCH MG: 1 TABLET ORAL at 10:50

## 2018-01-15 NOTE — PD.PSY.CON
Provisional Diagnosis


Admission Date


Jan 14, 2018 at 15:31


Axis I.


Schizophrenia, paranoid type





History of Present Illness


Service


Psychiatry


Consult Requested By


Dr. Browning


Reason for Consult


Second opinion


Primary Care Physician


No Primary Care Physician


HPI


22-year-old male with history of autism spectrum disorder, more recently 

diagnosed with schizophrenia, presents with severe auditory hallucinations and 

high anxiety.  Patient's mother brought him to the emergency department after 

he has been seen on multiple occasions and an outpatient and emergency 

department setting.  The patient has been experiencing auditory hallucinations, 

increasing in frequency and severity, over the last 6 months.  These are 

auditory hallucinations telling him he is bad, he will die, he is needing to go 

to correction, etc.  These hallucinations are very disturbing to him and he walks 

around the house, attempting to cover his years, block the hallucinations, and 

stop himself from harming himself.  His mother is at bedside, providing much of 

the history because the patient does currently appear to be internally 

preoccupied and complaining of auditory hallucinations.  According to the 

patient's mother, he is also quite paranoid, believing the voices and other 

people mean him harm.





The patient has been tried on a number of medications, including low dose 

Seroquel, Trileptal, 1 or 2 doses of Abilify, and most recently Saphris.  His 

mother reports Saphris has demonstrated some benefit but it now wears off too 

quickly.  In addition, the patient demonstrates "shaky" responses to the 

medications which appeared to the emergency department attending physician, Dr. gonsales, as being EPS.  As a result, the patient received IM Benadryl, which 

ameliorated the patient's shaky symptoms prior to this physician's arrival.





The patient remains a poor historian and is unable to provide a cogent history.

  No alcohol or illicit drug use.


 


01/15/18 - second opinion


Patient is a 22-year-old  man, single, domiciled with mother, 

unemployed, with a past psychiatric history of autism spectrum disorder with a 

recent diagnosis of schizophrenia, was brought in to the ED by his mother due 

to severe auditory hallucinations and increased anxiety which is admitted to 

the inpatient psychiatry for further evaluation and management.  Discussion 

with nursing staff reported that patient had described one of the auditory 

hallucinations as being "the  one" which commence him to hurt himself.  

Patient was found sitting on hospital bed eating breakfast, cooperative today.  

Patient states that he had been feeling somewhat "groggy" this morning stating 

that he had difficulty sleeping last evening due to having had an uncomfortable 

bed.  Patient states that his mood is "in between".  Patient reports having had 

auditory hallucinations last using stating that he would rather go to sleep 10 

to experienced these hallucinations.  Patient states that there is one bad 

voice and the rest of them are usually harmless.  Patient states that recently 

he has not had any command auditory hallucinations and not expressing any this 

morning.  Patient denies any SI or HI.





Past Family Social History


Coded Allergies:  


     No Known Allergies (Unverified , 1/12/18)


Active Scripts


Benztropine (Benztropine) 0.5 Mg Tab, 0.5 MG PO BID, #20 TAB 0 Refills


   Prov:Papi Coyle MD         1/12/18


Reported Medications


Asenapine (Saphris) 5 Mg Subl, 5 MG SL BID, #60 TAB.SL 0 Refills


   1/12/18


Discontinued Reported Medications


Quetiapine (Seroquel) 25 Mg Tab, 25 MG PO HS, #30 TAB 0 Refills


   1/12/18


Oxcarbazepine (Oxcarbazepine) 150 Mg Tab, 150 MG PO BID for Seizure Control, #

60 TAB 0 Refills


   1/12/18


Sertraline (Zoloft) 25 Mg Tab, 25 MG PO DAILY, #30 TAB 0 Refills


   12/23/17


Discontinued Scripts


Lorazepam (Ativan) 0.5 Mg Tab, 0.5 MG PO Q6H Y for ANXIETY AND/OR AGITATION, #

20 TAB 0 Refills


   Prov:Papi Coyle MD         1/12/18


Aripiprazole (Abilify) 10 Mg Tab, 10 MG PO 1/2 po  am  and hs for health, #14 

TAB 0 Refills


   Prov:Jian Mackay MD         12/24/17





Current Medications








 Medications


  (Trade)  Dose


 Ordered  Sig/Layo


 Route  Start Time


 Stop Time Status Last Admin


 


  (Ativan)  1 mg  Q6H  PRN


 PO  1/14/18 15:30


    1/14/18 20:08


 


 


  (Ativan Inj)  1 mg  Q6H  PRN


 IM  1/14/18 15:30


     


 


 


  (Benadryl)  50 mg  Q6H  PRN


 PO  1/14/18 15:30


     


 


 


  (Benadryl Inj)  50 mg  Q6H  PRN


 IM  1/14/18 15:30


     


 


 


  (Tylenol)  650 mg  Q4H  PRN


 PO  1/14/18 15:30


     


 


 


  (Milk Of


 Magnesia Liq)  30 ml  DAILY  PRN


 PO  1/14/18 15:30


     


 


 


  (Mag-Al Plus


 Susp Liq)  30 ml  Q6H  PRN


 PO  1/14/18 15:30


     


 


 


  (Cogentin)  0.5 mg  BID


 PO  1/14/18 21:00


    1/14/18 21:00


 


 


 Patient Own


 Medication  PT OWN


 MED:


 SAPHRIS...  BID


 SL  1/15/18 09:00


     


 


 


  (Pill Splitter)  1 ea  UNSCH  PRN


 OTHER  1/14/18 21:15


     


 








Patient's Strengths (min. 2)


Resilient and has access to healthcare.





Physical Exam


Vital Signs





Vital Signs








  Date Time  Temp Pulse Resp B/P (MAP) Pulse Ox O2 Delivery O2 Flow Rate FiO2


 


1/15/18 06:20 98.0 97 16 111/58 (75) 97   


 


1/14/18 16:19      Room Air  














I/O   


 


 1/15/18 1/15/18 1/16/18





 08:00 16:00 00:00


 


Intake Total 0 ml 240 ml 


 


Balance 0 ml 240 ml 








Lab Results











Test


  1/14/18


14:16 1/14/18


15:47 1/14/18


15:56 1/15/18


07:20


 


Urine Color LIGHT-YELLOW    


 


Urine Turbidity CLEAR    


 


Urine pH 8.0    


 


Urine Specific Gravity 1.010    


 


Urine Protein NEG mg/dL    


 


Urine Glucose (UA) NEG mg/dL    


 


Urine Ketones NEG mg/dL    


 


Urine Occult Blood NEG    


 


Urine Nitrite NEG    


 


Urine Bilirubin NEG    


 


Urine Urobilinogen


  LESS THAN 2.0


MG/DL 


  


  


 


 


Urine Leukocyte Esterase NEG    


 


Urine WBC


  LESS THAN 1


/hpf 


  


  


 


 


Urine Mucus FEW /lpf    


 


Microscopic Urinalysis Comment


  CULT NOT


INDICATED 


  


  


 


 


White Blood Count  8.4 TH/MM3   8.5 TH/MM3 


 


Red Blood Count  5.35 MIL/MM3   5.35 MIL/MM3 


 


Hemoglobin  15.5 GM/DL   15.6 GM/DL 


 


Hematocrit  44.6 %   44.0 % 


 


Mean Corpuscular Volume  83.4 FL   82.4 FL 


 


Mean Corpuscular Hemoglobin  28.9 PG   29.1 PG 


 


Mean Corpuscular Hemoglobin


Concent 


  34.7 % 


  


  35.3 % 


 


 


Red Cell Distribution Width  13.2 %   13.4 % 


 


Platelet Count  300 TH/MM3   280 TH/MM3 


 


Mean Platelet Volume  7.8 FL   7.5 FL 


 


Neutrophils (%) (Auto)  73.3 %   65.9 % 


 


Lymphocytes (%) (Auto)  17.7 %   25.6 % 


 


Monocytes (%) (Auto)  7.3 %   7.3 % 


 


Eosinophils (%) (Auto)  1.2 %   1.0 % 


 


Basophils (%) (Auto)  0.5 %   0.2 % 


 


Neutrophils # (Auto)  6.2 TH/MM3   5.6 TH/MM3 


 


Lymphocytes # (Auto)  1.5 TH/MM3   2.2 TH/MM3 


 


Monocytes # (Auto)  0.6 TH/MM3   0.6 TH/MM3 


 


Eosinophils # (Auto)  0.1 TH/MM3   0.1 TH/MM3 


 


Basophils # (Auto)  0.0 TH/MM3   0.0 TH/MM3 


 


CBC Comment  DIFF FINAL   DIFF FINAL 


 


Differential Comment      


 


Blood Urea Nitrogen  9 MG/DL   10 MG/DL 


 


Creatinine  0.92 MG/DL   1.03 MG/DL 


 


Random Glucose  81 MG/DL   92 MG/DL 


 


Total Protein  7.8 GM/DL   7.4 GM/DL 


 


Albumin  4.0 GM/DL   3.9 GM/DL 


 


Calcium Level  9.2 MG/DL   9.1 MG/DL 


 


Alkaline Phosphatase  147 U/L   144 U/L 


 


Aspartate Amino Transf


(AST/SGOT) 


  57 U/L 


  


  18 U/L 


 


 


Alanine Aminotransferase


(ALT/SGPT) 


  37 U/L 


  


  28 U/L 


 


 


Total Bilirubin  0.5 MG/DL   0.3 MG/DL 


 


Sodium Level  138 MEQ/L   140 MEQ/L 


 


Potassium Level  5.2 MEQ/L   3.9 MEQ/L 


 


Chloride Level  104 MEQ/L   106 MEQ/L 


 


Carbon Dioxide Level  27.5 MEQ/L   27.0 MEQ/L 


 


Anion Gap  7 MEQ/L   7 MEQ/L 


 


Estimat Glomerular Filtration


Rate 


  103 ML/MIN 


  


  90 ML/MIN 


 


 


Thyroid Stimulating Hormone


3rd Gen 


  1.870 uIU/ML 


  


  


 


 


Ethyl Alcohol Level


  


  LESS THAN 3


MG/DL 


  


 


 


Urine Opiates Screen   NEG  


 


Urine Barbiturates Screen   NEG  


 


Urine Amphetamines Screen   NEG  


 


Urine Benzodiazepines Screen   NEG  


 


Urine Cocaine Screen   NEG  


 


Urine Cannabinoids Screen   NEG  


 


Hemoglobin A1c    5.1 % 


 


Triglycerides Level    131 MG/DL 


 


Cholesterol Level    165 MG/DL 


 


LDL Cholesterol    104 MG/DL 


 


HDL Cholesterol    35.2 MG/DL 


 


Cholesterol/HDL Ratio    4.68 RATIO 


 


Vitamin B12 Level    876 PG/ML 


 


25-Hydroxy Vitamin D Total    17.8 ng/ML 











Mental Status Examination


Appearance:  Appropriate


Consciousness:  Alert


Orientation:  Person, Place


Motor Activity:  Normal gait


Speech:  Other


Language:  Other


Fund of Knowledge:  Inadequate


Attention and Concentration:  Easily Distracted


Memory:  Impaired


Mood:  Sad, Anxious


Affect:  Sad, Anxious


Thought Process & Associations:  Intact


Thought Content:  Hallucinations, Delusional


Hallucination Type:  Auditory


Delusion Type:  Paranoid


Suicidal Ideation:  Yes


Suicidal Plan:  No


Suicidal Intention:  No


Homicidal Ideation:  No


Homicidal Plan:  No


Homicidal Intention:  No


Insight:  Poor


Judgment:  Impulsive





Assessment & Plan


Problem List:  


(1) Schizophrenia, paranoid type


ICD Codes:  F20.0 - Paranoid schizophrenia


Assessment & Plan


Patient initially seen by Dr. Navi Browning his initial psych eval reviewed and 

agreed with.  Dr. Browning has signed first opinion petition supporting Lopez act.

  I agree.  Patient meets criteria for involuntary psychiatric hospitalization 

under the [place] act thus will cosign second opinion petition supporting Lopez 

act Dr. Browning .


Patient currently on a 17 which is nonformulary medication for this hospital 

but was brought in by mother and is currently being provided to him by the 

supply that mother had brought in.  We'll increase to 5 mg a.m., 10 mg at 

bedtime.  Will ask mother to bring in more supply so that patient can continue 

to receive this medication here during his hospitalization.  Continue to 

monitor mood and behavior.  Discharge planning in progress











Juvenal Thompson MD Osorio 15, 2018 09:10

## 2018-01-15 NOTE — EKG
Date Performed: 01/14/2018       Time Performed: 15:53:22

 

PTAGE:      22 years

 

EKG:      Sinus rhythm 

 

 WITH SINUS ARRHYTHMIA POSSIBLE RIGHT VENTRICULAR CONDUCTION DELAY BORDERLINE ECG 

 

NO PREVIOUS TRACING            

 

DOCTOR:   Richard Ordoñez  Interpretating Date/Time  01/15/2018 15:35:23

## 2018-01-15 NOTE — EKG
Date Performed: 01/15/2018       Time Performed: 06:57:22

 

PTAGE:      22 years

 

EKG:      Sinus rhythm 

 

 POSSIBLE RIGHT VENTRICULAR CONDUCTION DELAY EARLY REPOLARIZATION Since previous tracing, no signific
ant change noted BORDERLINE ECG

 

PREVIOUS TRACING       : 01/14/2018 15.53

 

DOCTOR:   Richard Ordoñez  Interpretating Date/Time  01/15/2018 15:36:19

## 2018-01-16 VITALS
HEART RATE: 107 BPM | DIASTOLIC BLOOD PRESSURE: 57 MMHG | SYSTOLIC BLOOD PRESSURE: 118 MMHG | OXYGEN SATURATION: 97 % | TEMPERATURE: 97.9 F | RESPIRATION RATE: 16 BRPM

## 2018-01-16 VITALS
SYSTOLIC BLOOD PRESSURE: 126 MMHG | DIASTOLIC BLOOD PRESSURE: 56 MMHG | OXYGEN SATURATION: 98 % | HEART RATE: 84 BPM | TEMPERATURE: 97.5 F | RESPIRATION RATE: 16 BRPM

## 2018-01-16 RX ADMIN — BENZTROPINE MESYLATE SCH MG: 1 TABLET ORAL at 20:58

## 2018-01-16 RX ADMIN — BENZTROPINE MESYLATE SCH MG: 1 TABLET ORAL at 10:31

## 2018-01-16 NOTE — HHI.PYPN
Subjective


Remarks


Patient seen for follow-up, chart review.  Patient is just a reported patient 

had been doing well, been compliant with medications no behavioral issues.  

Patient was found sitting in hospital bed having breakfast noted to be calm and 

cooperative.  Patient states that he has been feeling "okay" denies that he 

"shakes" when he is upset but this resolves when he is happy.  Patient reports 

having slept well, eating and drinking well difficulty with bowel movement.  

Patient states that his mood today is "so far so good", denies having auditory 

hallucinations today stating of the are "quiet" and the last time he had 

experience auditory hallucinations was yesterday.





Review of Systems


Except as stated in HPI:  all other systems reviewed are Neg





Mental Status Examination


Appearance:  Appropriate


Consciousness:  Alert


Orientation:  Person, Place


Motor Activity:  Normal gait


Speech:  Other


Language:  Other


Fund of Knowledge:  Inadequate


Attention and Concentration:  Easily Distracted


Memory:  Impaired


Mood:  Appropriate


Affect:  Anxious (less so today)


Thought Process & Associations:  Other (concrete)


Thought Content:  Hallucinations, Delusional


Hallucination Type:  Auditory (denies today)


Delusion Type:  Paranoid


Suicidal Ideation:  No


Suicidal Plan:  No


Suicidal Intention:  No


Homicidal Ideation:  No


Homicidal Plan:  No


Homicidal Intention:  No


Insight:  Poor


Judgment:  Impulsive





Results


Vitals/IOs





Vital Signs








  Date Time  Temp Pulse Resp B/P (MAP) Pulse Ox O2 Delivery O2 Flow Rate FiO2


 


1/16/18 05:43 97.5 84 16 126/56 (79) 98   


 


1/14/18 16:19      Room Air  














Intake and Output   


 


 1/16/18 1/16/18 1/17/18





 08:00 16:00 00:00


 


Intake Total 240 ml  


 


Balance 240 ml  











Assessment & Plan


Problem List:  


(1) Schizophrenia, paranoid type


ICD Codes:  F20.0 - Paranoid schizophrenia


Assessment & Plan


Patient at this time noted to have improvement with decrease in auditory 

hallucinations, continues to report stable mood, has not had any behavioral 

dyscontrol since admission.  We'll continue current treatment as there is some 

improvement.  We'll continue to monitor mood and behavior.  Discharge planning 

in progress


Justification for Cont. Inpt.


At risk for further decompensation if at lower level of care


Discharge Planning


To return back to mother's residence when psychiatrically stable











Juvenal Thompson MD Jan 16, 2018 08:03

## 2018-01-17 VITALS
RESPIRATION RATE: 16 BRPM | OXYGEN SATURATION: 97 % | SYSTOLIC BLOOD PRESSURE: 118 MMHG | DIASTOLIC BLOOD PRESSURE: 58 MMHG | HEART RATE: 101 BPM | TEMPERATURE: 97.6 F

## 2018-01-17 RX ADMIN — BENZTROPINE MESYLATE SCH MG: 1 TABLET ORAL at 09:30

## 2018-01-17 NOTE — HHI.DS
Psychiatry Discharge Summary


Inpatient Psychiatric care?:  Yes


Advance Directive:  No


Reason Not Provided:  Due to Patient Condition


Mental Health AdvanceDirective:  No


Health Care Proxy:  No


Admission


Admission Date


Jan 14, 2018 at 15:31


Admission Diagnosis:  


(1) Schizophrenia, paranoid type


ICD Code:  F20.0 - Paranoid schizophrenia


Brief History


22-year-old male with history of autism spectrum disorder, more recently 

diagnosed with schizophrenia, presents with severe auditory hallucinations and 

high anxiety.  Patient's mother brought him to the emergency department after 

he has been seen on multiple occasions and an outpatient and emergency 

department setting.  The patient has been experiencing auditory hallucinations, 

increasing in frequency and severity, over the last 6 months.  These are 

auditory hallucinations telling him he is bad, he will die, he is needing to go 

to residential, etc.  These hallucinations are very disturbing to him and he walks 

around the house, attempting to cover his years, block the hallucinations, and 

stop himself from harming himself.  His mother is at bedside, providing much of 

the history because the patient does currently appear to be internally 

preoccupied and complaining of auditory hallucinations.  According to the 

patient's mother, he is also quite paranoid, believing the voices and other 

people mean him harm.





The patient has been tried on a number of medications, including low dose 

Seroquel, Trileptal, 1 or 2 doses of Abilify, and most recently Saphris.  His 

mother reports Saphris has demonstrated some benefit but it now wears off too 

quickly.  In addition, the patient demonstrates "shaky" responses to the 

medications which appeared to the emergency department attending physician, Dr. gonsales, as being EPS.  As a result, the patient received IM Benadryl, which 

ameliorated the patient's shaky symptoms prior to this physician's arrival.





The patient remains a poor historian and is unable to provide a cogent history.

  No alcohol or illicit drug use.


 


01/15/18 - second opinion


Patient is a 22-year-old  man, single, domiciled with mother, 

unemployed, with a past psychiatric history of autism spectrum disorder with a 

recent diagnosis of schizophrenia, was brought in to the ED by his mother due 

to severe auditory hallucinations and increased anxiety which is admitted to 

the inpatient psychiatry for further evaluation and management.  Discussion 

with nursing staff reported that patient had described one of the auditory 

hallucinations as being "the  one" which commence him to hurt himself.  

Patient was found sitting on hospital bed eating breakfast, cooperative today.  

Patient states that he had been feeling somewhat "groggy" this morning stating 

that he had difficulty sleeping last evening due to having had an uncomfortable 

bed.  Patient states that his mood is "in between".  Patient reports having had 

auditory hallucinations last using stating that he would rather go to sleep 10 

to experienced these hallucinations.  Patient states that there is one bad 

voice and the rest of them are usually harmless.  Patient states that recently 

he has not had any command auditory hallucinations and not expressing any this 

morning.  Patient denies any SI or HI.


Tobacco Use In Past 30 Days:  No Tobacco Past 30 Days


Alcohol Use:  Never


Hospital Course


Patient is a 22-year-old  man, single, domiciled with mother, 

unemployed, with a past psychiatric history of autism spectrum disorder with a 

recent diagnosis of schizophrenia, was brought in to the ED by his mother due 

to severe auditory hallucinations and increased anxiety which is admitted to 

the inpatient psychiatry for further evaluation and management.  Patient was 

started on risperidone and titrated to 1mg PO BID for psychosis, auditory 

hallucinations.  Pt had not presented clinical sx of depression.  The hospital 

course and treatment was based in restarting patient on his medication which 

there was minimal response to (asenapine) and switched over to risperidone to re

-establish stabilization.  Patient was not actively suicidal or homicidal.  

Patient does not report sx of delusion, denies sx of idea of reference, 

disorder of self, did not present disorder of awareness. Patient denies sx of 

derealization or depersonalization. Patient denies sx of PTSD or panic attack. 

Pt denies feeling of hopelessness, worthlessness or helplessness. Patient 

denies suicidal or homicidal ideations. Patient denies sx of perceptual 

disturbance such as AH, VH or delusions.  At present time, the patient does not 

present an acute mental crisis requiring continuous inpatient psych 

hospitalization. Pt is mentally stable to f/u in outpatient.  . Patient denies 

SI, HI, AVH or delusions.  Supportive psychotherapy provided.   Patient advised 

to call 911 or return back to the ED in case of any emergency.  Patient agrees 

with plan.





Results


Blood Pressure


118 / 58





Vital Signs








  Date Time  Temp Pulse Resp B/P (MAP) Pulse Ox O2 Delivery O2 Flow Rate FiO2


 


1/17/18 05:13 97.6 101 16 118/58 (78) 97   


 


1/14/18 16:19      Room Air  











Laboratory Tests








Test


  1/14/18


14:16 1/14/18


15:47 1/14/18


15:56 1/15/18


07:20


 


Urine Mucus FEW /lpf (OCC)    


 


Neutrophils (%) (Auto)


  


  73.3 %


(16.0-70.0) 


  


 


 


Alkaline Phosphatase


  


  147 U/L


() 


  144 U/L


()


 


Aspartate Amino Transf


(AST/SGOT) 


  57 U/L (15-37) 


  


  


 


 


Potassium Level


  


  5.2 MEQ/L


(3.5-5.1) 


  


 


 


LDL Cholesterol


  


  


  


  104 MG/DL


(0-99)


 


HDL Cholesterol


  


  


  


  35.2 MG/DL


(40.0-60.0)


 


25-Hydroxy Vitamin D Total


  


  


  


  17.8 ng/ML


()








 Laboratory Results








Test


  1/15/18


07:20


 


Cholesterol Level


  165 MG/DL


(120-200)


 


HDL Cholesterol


  35.2 MG/DL


(40.0-60.0)


 


Hemoglobin A1c


  5.1 %


(4.3-6.0)


 


LDL Cholesterol


  104 MG/DL


(0-99)


 


Triglycerides Level


  131 MG/DL


()








Summary of Procedures


none


Imaging





Last Impressions








Chest X-Ray 1/14/18 0000 Signed





Impressions: 





 Service Date/Time:  Sunday, January 14, 2018 14:24 - CONCLUSION:  No acute 





 cardiopulmonary disease.     Zander Reddy MD 








Pending results at discharge:  No





Medications


# of Antipsychotic meds at D/C:  1


Approp Antipsych med options


1 - Minimum of three failed multiple trials of monotherapy.


2 - Documented plan to taper to monotherapy due to previous use of multiple 

meds OR cross-taper in progress at D/C.


3 - Documentation of augmentation of Clozapine.


4 - Justification other than those listed in allowable values 1-3, document here

:





Discharge


Discharge Date:  Jan 17, 2018


Discharge Diagnosis:  


(1) Schizophrenia, paranoid type


ICD Code:  F20.0 - Paranoid schizophrenia


Pt Condition on Discharge:  Stable


Discharge Disposition:  Discharge Home





Discharge Instructions


Diet Instructions:  As Tolerated, No Restrictions


Activities you can perform:  Regular-No Restrictions


Scheduled Appointment:  [facility]


Appointment Date:  Jan 24, 2018


Appointment Time:  1:00pm





Discharge Time


> 30 minutes





Mental Status Examination


Appearance:  Appropriate


Consciousness:  Alert


Orientation:  Person, Place


Motor Activity:  Normal gait


Speech:  Other


Language:  Other


Fund of Knowledge:  Inadequate


Attention and Concentration:  Adequate


Memory:  Impaired


Mood:  Appropriate


Affect:  Appropriate


Thought Process & Associations:  Logical, Linear, Other (concrete)


Thought Content:  Appropriate


Hallucination Type:  None


Delusion Type:  None, Paranoid


Suicidal Ideation:  No


Suicidal Plan:  No


Suicidal Intention:  No


Homicidal Ideation:  No


Homicidal Plan:  No


Homicidal Intention:  No


Insight:  Fair


Judgment:  Impulsive





Discharge/Advance Care Plan


Health Problems:  


(1) Schizophrenia, paranoid type


Goals to promote your health


* To prevent worsening of your condition and complications


* To maintain your health at the optimal level


Directions to meet your goals


*** Take your medications as prescribed


***  Follow your dietary instruction


***  Follow activity as directed





***  Keep your appointments as scheduled


***  Take your immunizations and boosters as scheduled


***  If your symptoms worsen call your PCP, if no PCP go to Urgent Care Center 

or Emergency Room ***


***  For 24/7 questions related to your inpatient stay or results of tests 

pending at discharge, please contact Dr. Juvenal Thompson at [phone]


***  Smoking is Dangerous to Your Health. Avoid second hand smoking ***











Juvenal Thompson MD Jan 17, 2018 09:47